# Patient Record
Sex: MALE | Race: WHITE | Employment: UNEMPLOYED | URBAN - METROPOLITAN AREA
[De-identification: names, ages, dates, MRNs, and addresses within clinical notes are randomized per-mention and may not be internally consistent; named-entity substitution may affect disease eponyms.]

---

## 2021-12-14 ENCOUNTER — HOSPITAL ENCOUNTER (EMERGENCY)
Age: 26
Discharge: HOME | End: 2021-12-14
Payer: MEDICAID

## 2021-12-14 VITALS
HEART RATE: 75 BPM | DIASTOLIC BLOOD PRESSURE: 81 MMHG | OXYGEN SATURATION: 100 % | SYSTOLIC BLOOD PRESSURE: 116 MMHG | TEMPERATURE: 97.6 F | RESPIRATION RATE: 18 BRPM

## 2021-12-14 DIAGNOSIS — T50.901A ACCIDENTAL DRUG OVERDOSE, INITIAL ENCOUNTER: Primary | ICD-10-CM

## 2021-12-14 PROCEDURE — 99284 EMERGENCY DEPT VISIT MOD MDM: CPT

## 2021-12-14 NOTE — ED NOTES
Bed: 29  Expected date:   Expected time:   Means of arrival: [facility]  Comments:  [address] [facility]  12/14/21 1023

## 2021-12-15 ENCOUNTER — HOSPITAL ENCOUNTER (EMERGENCY)
Age: 26
Discharge: HOME | End: 2021-12-15
Attending: EMERGENCY MEDICINE
Payer: MEDICAID

## 2021-12-15 VITALS
OXYGEN SATURATION: 97 % | DIASTOLIC BLOOD PRESSURE: 89 MMHG | RESPIRATION RATE: 18 BRPM | TEMPERATURE: 97.8 F | WEIGHT: 187 LBS | SYSTOLIC BLOOD PRESSURE: 123 MMHG | HEART RATE: 82 BPM

## 2021-12-15 DIAGNOSIS — F11.10 NARCOTIC ABUSE (HCC): Primary | ICD-10-CM

## 2021-12-15 PROCEDURE — 99284 EMERGENCY DEPT VISIT MOD MDM: CPT

## 2021-12-15 NOTE — ED NOTES
Bed: 27  Expected date:   Expected time:   Means of arrival: [facility] [place]  Comments:  [facility] [address] [facility], [address] [facility]  12/15/21 0941

## 2021-12-15 NOTE — ED PROVIDER NOTES
[address] [facility]  ED  EMERGENCY DEPARTMENT ENCOUNTER      Pt Name: Raciel Jane  MRN: [id number]  [place] 1995  Date of evaluation: 12/15/2021  Provider: Cristiano Pabon MD    CHIEF COMPLAINT       Chief Complaint   Patient presents with    Drug Overdose     pt comes for being found slumped over in car unresponsive pt responded to painful stimuli, herion use today         HISTORY OF PRESENT ILLNESS   (Location/Symptom, Timing/Onset, Context/Setting, Quality, Duration, Modifying Factors, Severity)  Note limiting factors. Raciel Jane is a 31 y.o. male with past medical history of opiate abuse here today after he was found in his car sleeping. The patient freely admits he has a longstanding history of narcotic abuse and was seen in the emergency department yesterday for an accidental narcotic overdose. He received Narcan at that time and after being observed in the emergency department was ultimately discharged home. He states that due to that and multiple other social stressors throughout the past few days he has not been sleeping well. He states today he pulled his car over this morning to take a nap on the side of the road when he was ultimately found asleep in his car. Bystanders noted that he would not wake up and police/paramedics were called. They eventually were able to get into the patient's car and he woke up with sternal rub. No Narcan was administered. Patient states he last used narcotics yesterday at midnight but has not used since then    [facility]    Nursing Notes were reviewed. REVIEW OF SYSTEMS    (2-9 systems for level 4, 10 or more for level 5)     Review of Systems    Please see HPI for pertinent positive and negative review of system findings. A full 10 system ROS was performed and otherwise negative. PAST MEDICAL HISTORY   No past medical history on file. SURGICAL HISTORY     No past surgical history on file.       CURRENT MEDICATIONS       There are no discharge medications for this patient. ALLERGIES     Patient has no known allergies. FAMILY HISTORY     No family history on file. SOCIAL HISTORY       Social History     Socioeconomic History    Marital status:      Spouse name: Not on file    Number of children: Not on file    Years of education: Not on file    Highest education level: Not on file   Occupational History    Not on file   Tobacco Use    Smoking status: Not on file    Smokeless tobacco: Not on file   Substance and Sexual Activity    Alcohol use: Not on file    Drug use: Yes     Types: IV     Comment: herioin/fentayl    Sexual activity: Yes     Partners: Female   Other Topics Concern    Not on file   Social History Narrative    Not on file     Social Determinants of Health     Financial Resource Strain:     Difficulty of Paying Living Expenses: Not on file   Food Insecurity:     Worried About Running Out of Food in the Last Year: Not on file    [place] of Food in the Last Year: Not on file   Transportation Needs:     Lack of Transportation (Medical): Not on file    Lack of Transportation (Non-Medical):  Not on file   Physical Activity:     Days of Exercise per Week: Not on file    Minutes of Exercise per Session: Not on file   Stress:     Feeling of Stress : Not on file   Social Connections:     Frequency of Communication with Friends and Family: Not on file    Frequency of Social Gatherings with Friends and Family: Not on file    Attends [employer] Services: Not on file    Active Member of Clubs or Organizations: Not on file    Attends Club or Organization Meetings: Not on file    Marital Status: Not on file   Intimate Partner Violence:     Fear of Current or Ex-Partner: Not on file    Emotionally Abused: Not on file    Physically Abused: Not on file    Sexually Abused: Not on file   Housing Stability:     Unable to Pay for Housing in the Last Year: Not on file    Number of [place] in the Last Year: Not on file    Unstable Housing in the Last Year: Not on file       SCREENINGS               PHYSICAL EXAM    (up to 7 for level 4, 8 or more for level 5)     ED Triage Vitals [12/15/21 1447]   BP Temp Temp Source Pulse Resp SpO2 Height Weight   123/89 97.8 °F (36.6 °C) Oral 82 18 97 % -- 187 lb (84.8 kg)       Physical Exam    General appearance:  Cooperative. No acute distress. Skin:  Warm. Dry. Multiple open and scabbed over sores in the bilateral hands and face without surrounding erythema, discharge or drainage  Eye:  Extraocular movements intact. Ears, nose, mouth and throat:  Oral mucosa moist,  Neck:  Trachea midline. Heart:  Regular rate and rhythm  Perfusion:  intact  Respiratory:  Lungs clear to auscultation bilaterally. Respirations nonlabored. Abdominal:   Non distended. Nontender  Neurological:  Alert and oriented x 3. Moves all extremities spontaneously  Musculoskeletal:   Normal ROM, no deformities          Psychiatric:  Normal mood      DIAGNOSTIC RESULTS       Labs Reviewed - No data to display    Interpretation per the Radiologist below, if obtained/available at the time of this note:    No orders to display       All other labs/imaging were within normal range or not returned as of this dictation. EMERGENCY DEPARTMENT COURSE and DIFFERENTIAL DIAGNOSIS/MDM:   Vitals:    Vitals:    12/15/21 1447 12/15/21 1507   BP: 123/89    Pulse: 82    Resp: 18 18   Temp: 97.8 °F (36.6 °C)    TempSrc: Oral    SpO2: 97%    Weight: 187 lb (84.8 kg)        Patient presents after he was found asleep in his car. Difficult to arouse but now awake alert oriented. Did not require Narcan. Has no myosis. Is wide-awake at present. Denies any drug use in over 15 hours. No concern for accidental overdose at present. Stable vitals.   Unremarkable exam.  Did not require any further observation or laboratory work-up and will be discharged home    MDM    CONSULTS     None    Critical Care: